# Patient Record
Sex: FEMALE | Race: BLACK OR AFRICAN AMERICAN | Employment: FULL TIME | URBAN - METROPOLITAN AREA
[De-identification: names, ages, dates, MRNs, and addresses within clinical notes are randomized per-mention and may not be internally consistent; named-entity substitution may affect disease eponyms.]

---

## 2023-02-10 PROBLEM — R35.0 INCREASED FREQUENCY OF URINATION: Status: ACTIVE | Noted: 2023-02-10

## 2023-02-10 PROBLEM — N95.1 PERIMENOPAUSAL SYMPTOMS: Status: ACTIVE | Noted: 2023-02-10

## 2023-02-10 PROBLEM — R03.0 ELEVATED BP WITHOUT DIAGNOSIS OF HYPERTENSION: Status: ACTIVE | Noted: 2023-02-10

## 2023-02-10 PROBLEM — E78.2 COMBINED HYPERLIPIDEMIA: Status: ACTIVE | Noted: 2023-02-10

## 2023-02-10 PROBLEM — D64.9 ANEMIA: Status: ACTIVE | Noted: 2023-02-10

## 2023-02-10 PROBLEM — K92.1 BLOOD IN STOOL: Status: ACTIVE | Noted: 2023-02-10

## 2023-02-10 PROBLEM — F32.81 PREMENSTRUAL DYSPHORIA: Status: ACTIVE | Noted: 2023-02-10

## 2023-02-10 PROBLEM — H52.32 ANISOMETROPIA AND ANISEIKONIA: Status: ACTIVE | Noted: 2023-02-10

## 2023-02-10 PROBLEM — I10 BENIGN ESSENTIAL HYPERTENSION: Status: ACTIVE | Noted: 2023-02-10

## 2023-02-10 PROBLEM — H52.4 BILATERAL PRESBYOPIA: Status: ACTIVE | Noted: 2023-02-10

## 2023-02-10 PROBLEM — B96.89 BACTERIAL VAGINOSIS: Status: ACTIVE | Noted: 2023-02-10

## 2023-02-10 PROBLEM — R12 HEARTBURN: Status: ACTIVE | Noted: 2023-02-10

## 2023-02-10 PROBLEM — H52.03 HYPERMETROPIA OF BOTH EYES: Status: ACTIVE | Noted: 2023-02-10

## 2023-02-10 PROBLEM — N76.0 BACTERIAL VAGINOSIS: Status: ACTIVE | Noted: 2023-02-10

## 2023-02-10 PROBLEM — H52.31 ANISOMETROPIA AND ANISEIKONIA: Status: ACTIVE | Noted: 2023-02-10

## 2023-02-10 PROBLEM — E66.9 OBESITY (BMI 35.0-39.9 WITHOUT COMORBIDITY): Status: ACTIVE | Noted: 2023-02-10

## 2023-04-03 ENCOUNTER — OFFICE VISIT (OUTPATIENT)
Dept: PRIMARY CARE | Facility: CLINIC | Age: 48
End: 2023-04-03
Payer: COMMERCIAL

## 2023-04-03 ENCOUNTER — LAB (OUTPATIENT)
Dept: LAB | Facility: LAB | Age: 48
End: 2023-04-03
Payer: COMMERCIAL

## 2023-04-03 ENCOUNTER — APPOINTMENT (OUTPATIENT)
Dept: LAB | Facility: LAB | Age: 48
End: 2023-04-03
Payer: COMMERCIAL

## 2023-04-03 VITALS
HEART RATE: 84 BPM | BODY MASS INDEX: 32.2 KG/M2 | OXYGEN SATURATION: 97 % | DIASTOLIC BLOOD PRESSURE: 85 MMHG | WEIGHT: 175 LBS | SYSTOLIC BLOOD PRESSURE: 131 MMHG | TEMPERATURE: 96.4 F | HEIGHT: 62 IN

## 2023-04-03 DIAGNOSIS — Z00.00 HEALTH MAINTENANCE EXAMINATION: ICD-10-CM

## 2023-04-03 DIAGNOSIS — I10 BENIGN ESSENTIAL HYPERTENSION: ICD-10-CM

## 2023-04-03 DIAGNOSIS — R23.2 HOT FLASHES: ICD-10-CM

## 2023-04-03 DIAGNOSIS — N92.6 IRREGULAR MENSES: ICD-10-CM

## 2023-04-03 DIAGNOSIS — Z00.00 HEALTH MAINTENANCE EXAMINATION: Primary | ICD-10-CM

## 2023-04-03 LAB
ANION GAP IN SER/PLAS: 12 MMOL/L (ref 10–20)
CALCIUM (MG/DL) IN SER/PLAS: 9.4 MG/DL (ref 8.6–10.6)
CARBON DIOXIDE, TOTAL (MMOL/L) IN SER/PLAS: 27 MMOL/L (ref 21–32)
CHLORIDE (MMOL/L) IN SER/PLAS: 103 MMOL/L (ref 98–107)
CHOLESTEROL (MG/DL) IN SER/PLAS: 202 MG/DL (ref 0–199)
CHOLESTEROL IN HDL (MG/DL) IN SER/PLAS: 44.1 MG/DL
CHOLESTEROL/HDL RATIO: 4.6
CREATININE (MG/DL) IN SER/PLAS: 0.76 MG/DL (ref 0.5–1.05)
GFR FEMALE: >90 ML/MIN/1.73M2
GLUCOSE (MG/DL) IN SER/PLAS: 93 MG/DL (ref 74–99)
HEPATITIS C VIRUS AB PRESENCE IN SERUM: NONREACTIVE
HIV 1/ 2 AG/AB SCREEN: NONREACTIVE
LDL: 129 MG/DL (ref 0–99)
POTASSIUM (MMOL/L) IN SER/PLAS: 4.3 MMOL/L (ref 3.5–5.3)
PREGNANCY TEST URINE, POC: NEGATIVE
SODIUM (MMOL/L) IN SER/PLAS: 138 MMOL/L (ref 136–145)
TRIGLYCERIDE (MG/DL) IN SER/PLAS: 144 MG/DL (ref 0–149)
UREA NITROGEN (MG/DL) IN SER/PLAS: 13 MG/DL (ref 6–23)
VLDL: 29 MG/DL (ref 0–40)

## 2023-04-03 PROCEDURE — 81025 URINE PREGNANCY TEST: CPT | Performed by: FAMILY MEDICINE

## 2023-04-03 PROCEDURE — 1036F TOBACCO NON-USER: CPT | Performed by: FAMILY MEDICINE

## 2023-04-03 PROCEDURE — 3079F DIAST BP 80-89 MM HG: CPT | Performed by: FAMILY MEDICINE

## 2023-04-03 PROCEDURE — 36415 COLL VENOUS BLD VENIPUNCTURE: CPT

## 2023-04-03 PROCEDURE — 87389 HIV-1 AG W/HIV-1&-2 AB AG IA: CPT

## 2023-04-03 PROCEDURE — 80061 LIPID PANEL: CPT

## 2023-04-03 PROCEDURE — 3075F SYST BP GE 130 - 139MM HG: CPT | Performed by: FAMILY MEDICINE

## 2023-04-03 PROCEDURE — 99396 PREV VISIT EST AGE 40-64: CPT | Performed by: FAMILY MEDICINE

## 2023-04-03 PROCEDURE — 80048 BASIC METABOLIC PNL TOTAL CA: CPT

## 2023-04-03 PROCEDURE — 86803 HEPATITIS C AB TEST: CPT

## 2023-04-03 RX ORDER — AMLODIPINE BESYLATE 2.5 MG/1
2.5 TABLET ORAL DAILY
Qty: 90 TABLET | Refills: 3 | Status: SHIPPED | OUTPATIENT
Start: 2023-04-03

## 2023-04-03 ASSESSMENT — PAIN SCALES - GENERAL: PAINLEVEL: 0-NO PAIN

## 2023-04-03 NOTE — PROGRESS NOTES
"Subjective   Patient ID: Ashley Gu is a 47 y.o. who presents for Follow-up (Pt is here for a BP check.).  HPI    Ashley Gu is a 47 y.o. year old here for an annual physical.    Concerns:     Hypertension  - Taking amlodipine 2.5 mg daily  - No headaches, chest pain, shortness of breath, vision changes, dizziness    Irregular menses  - Having hot flashes  - LMP February  - Using condoms  - Partner had vasectomy     Diet: Healthy diet  Exercise: Regular exercise   Tobacco: None  Alcohol: Occasional  Drugs: None  Sex: Monogamous 1 male partner  LMP: February  BIrth Control: Condoms, vasectomy  Depression: PHQ2=0  IPV: Negative    Immunizations due: None    Breast Cancer  - last mammogram: Dec 2022    Cervical Cancer  - last pap: April 2022    Colon Cancer  - last colonoscopy: 2020, due 2030    Lung Cancer: Not indicated    Lipid: LDL elevated last year, will repeat  A1c: Fasting blood sugar wnl  HIV (15-65): Never done, will order  HCV (18-79): Never done, will order       Review of Systems  10 point review of systems negative except as noted in HPI.    Objective     /85 (BP Location: Left arm, Patient Position: Sitting, BP Cuff Size: Large adult)   Pulse 84   Temp 35.8 °C (96.4 °F) (Temporal)   Ht 1.575 m (5' 2\")   Wt 79.4 kg (175 lb)   SpO2 97%   BMI 32.01 kg/m²   General: well appearing, no distress  Neck: No lymphadenopathy, no thyromegaly  CV: Regular rate and rhythm, no murmur  Lungs: Clear to auscultation bilaterally  Abdomen: Soft, nontender, nondistended  Extremities: No edema noted  Psych: Appropriate mood and affect     Assessment/Plan   46 yo F here for health maintenance exam     HM  - Up to date on routine cancer screening  - Labs as ordered    HTN  - Continue amlodipine    Hot flashes, irregular periods  - Suspect perimenopause  - Discussed options. She would like to see gynecology to discuss hot flashes  - Discussed birth control, she will consider  - Pregnancy test " negative today    RTC 6 months or sooner as needed

## 2023-04-12 DIAGNOSIS — Z30.09 BIRTH CONTROL COUNSELING: Primary | ICD-10-CM

## 2023-04-12 RX ORDER — NORETHINDRONE ACETATE AND ETHINYL ESTRADIOL 1MG-20(21)
1 KIT ORAL DAILY
Qty: 28 TABLET | Refills: 5 | Status: SHIPPED | OUTPATIENT
Start: 2023-04-12 | End: 2024-04-01 | Stop reason: WASHOUT

## 2023-06-09 ENCOUNTER — LAB (OUTPATIENT)
Dept: LAB | Facility: LAB | Age: 48
End: 2023-06-09
Payer: COMMERCIAL

## 2023-06-09 LAB — TOBACCO SCREEN, URINE: NEGATIVE

## 2023-08-02 LAB
ANION GAP IN SER/PLAS: 13 MMOL/L (ref 10–20)
CALCIUM (MG/DL) IN SER/PLAS: 9.5 MG/DL (ref 8.6–10.6)
CARBON DIOXIDE, TOTAL (MMOL/L) IN SER/PLAS: 27 MMOL/L (ref 21–32)
CHLORIDE (MMOL/L) IN SER/PLAS: 104 MMOL/L (ref 98–107)
CREATININE (MG/DL) IN SER/PLAS: 0.91 MG/DL (ref 0.5–1.05)
ERYTHROCYTE DISTRIBUTION WIDTH (RATIO) BY AUTOMATED COUNT: 14.1 % (ref 11.5–14.5)
ERYTHROCYTE MEAN CORPUSCULAR HEMOGLOBIN CONCENTRATION (G/DL) BY AUTOMATED: 32.7 G/DL (ref 32–36)
ERYTHROCYTE MEAN CORPUSCULAR VOLUME (FL) BY AUTOMATED COUNT: 93 FL (ref 80–100)
ERYTHROCYTES (10*6/UL) IN BLOOD BY AUTOMATED COUNT: 4.06 X10E12/L (ref 4–5.2)
GFR FEMALE: 77 ML/MIN/1.73M2
GLUCOSE (MG/DL) IN SER/PLAS: 115 MG/DL (ref 74–99)
HEMATOCRIT (%) IN BLOOD BY AUTOMATED COUNT: 37.6 % (ref 36–46)
HEMOGLOBIN (G/DL) IN BLOOD: 12.3 G/DL (ref 12–16)
LEUKOCYTES (10*3/UL) IN BLOOD BY AUTOMATED COUNT: 6.4 X10E9/L (ref 4.4–11.3)
NRBC (PER 100 WBCS) BY AUTOMATED COUNT: 0 /100 WBC (ref 0–0)
PLATELETS (10*3/UL) IN BLOOD AUTOMATED COUNT: 273 X10E9/L (ref 150–450)
POTASSIUM (MMOL/L) IN SER/PLAS: 4.2 MMOL/L (ref 3.5–5.3)
SODIUM (MMOL/L) IN SER/PLAS: 140 MMOL/L (ref 136–145)
UREA NITROGEN (MG/DL) IN SER/PLAS: 17 MG/DL (ref 6–23)

## 2023-08-22 RX ORDER — ACETAMINOPHEN 500 MG
TABLET ORAL
COMMUNITY
End: 2024-04-01 | Stop reason: WASHOUT

## 2023-08-22 RX ORDER — NORETHINDRONE 5 MG/1
1 TABLET ORAL 3 TIMES DAILY
COMMUNITY
Start: 2023-04-09 | End: 2024-04-01 | Stop reason: WASHOUT

## 2023-10-02 ENCOUNTER — OFFICE VISIT (OUTPATIENT)
Dept: OPHTHALMOLOGY | Facility: CLINIC | Age: 48
End: 2023-10-02
Payer: COMMERCIAL

## 2023-10-02 DIAGNOSIS — H52.31 ANISOMETROPIA AND ANISEIKONIA: Primary | ICD-10-CM

## 2023-10-02 DIAGNOSIS — H52.32 ANISOMETROPIA AND ANISEIKONIA: Primary | ICD-10-CM

## 2023-10-02 DIAGNOSIS — H52.4 BILATERAL PRESBYOPIA: ICD-10-CM

## 2023-10-02 PROCEDURE — 92015 DETERMINE REFRACTIVE STATE: CPT | Performed by: OPTOMETRIST

## 2023-10-02 PROCEDURE — 92014 COMPRE OPH EXAM EST PT 1/>: CPT | Performed by: OPTOMETRIST

## 2023-10-02 ASSESSMENT — TONOMETRY
OS_IOP_MMHG: 13
OD_IOP_MMHG: 13
IOP_METHOD: GOLDMANN APPLANATION

## 2023-10-02 ASSESSMENT — VISUAL ACUITY
OS_CC: 20/20-3
OD_CC: 20/20-2
METHOD: SNELLEN - LINEAR
CORRECTION_TYPE: GLASSES

## 2023-10-02 ASSESSMENT — REFRACTION_MANIFEST
OS_SPHERE: +0.75
OD_SPHERE: +1.75
OS_ADD: +2.25
OD_ADD: +2.25
OS_AXIS: 120
OS_CYLINDER: -0.25
OD_CYLINDER: SPHERE

## 2023-10-02 ASSESSMENT — ENCOUNTER SYMPTOMS
CARDIOVASCULAR NEGATIVE: 0
ENDOCRINE NEGATIVE: 0
MUSCULOSKELETAL NEGATIVE: 0
HEMATOLOGIC/LYMPHATIC NEGATIVE: 0
ALLERGIC/IMMUNOLOGIC NEGATIVE: 0
GASTROINTESTINAL NEGATIVE: 0
PSYCHIATRIC NEGATIVE: 0
CONSTITUTIONAL NEGATIVE: 0
RESPIRATORY NEGATIVE: 0
NEUROLOGICAL NEGATIVE: 0
EYES NEGATIVE: 0

## 2023-10-02 ASSESSMENT — REFRACTION_WEARINGRX
OS_ADD: +2.00
OD_CYLINDER: SPHERE
OS_CYLINDER: -0.25
OD_SPHERE: +1.75
OS_SPHERE: +0.50
OS_AXIS: 120
OD_ADD: +2.00

## 2023-10-02 ASSESSMENT — CUP TO DISC RATIO
OS_RATIO: 0.35
OD_RATIO: 0.3

## 2023-10-02 ASSESSMENT — SLIT LAMP EXAM - LIDS
COMMENTS: NORMAL
COMMENTS: NORMAL

## 2024-03-30 ASSESSMENT — PROMIS GLOBAL HEALTH SCALE
EMOTIONAL_PROBLEMS: RARELY
CARRYOUT_PHYSICAL_ACTIVITIES: COMPLETELY
RATE_GENERAL_HEALTH: VERY GOOD
RATE_SOCIAL_SATISFACTION: GOOD
RATE_AVERAGE_PAIN: 0
RATE_AVERAGE_FATIGUE: MILD
CARRYOUT_SOCIAL_ACTIVITIES: VERY GOOD
RATE_PHYSICAL_HEALTH: VERY GOOD
RATE_QUALITY_OF_LIFE: VERY GOOD
RATE_MENTAL_HEALTH: VERY GOOD

## 2024-04-01 ENCOUNTER — OFFICE VISIT (OUTPATIENT)
Dept: PRIMARY CARE | Facility: CLINIC | Age: 49
End: 2024-04-01
Payer: COMMERCIAL

## 2024-04-01 VITALS
BODY MASS INDEX: 31.28 KG/M2 | WEIGHT: 170 LBS | RESPIRATION RATE: 16 BRPM | TEMPERATURE: 98 F | OXYGEN SATURATION: 99 % | HEART RATE: 80 BPM | HEIGHT: 62 IN

## 2024-04-01 DIAGNOSIS — Z12.31 ENCOUNTER FOR SCREENING MAMMOGRAM FOR MALIGNANT NEOPLASM OF BREAST: ICD-10-CM

## 2024-04-01 DIAGNOSIS — I10 BENIGN ESSENTIAL HYPERTENSION: Primary | ICD-10-CM

## 2024-04-01 PROCEDURE — 99213 OFFICE O/P EST LOW 20 MIN: CPT | Performed by: FAMILY MEDICINE

## 2024-04-01 PROCEDURE — 1036F TOBACCO NON-USER: CPT | Performed by: FAMILY MEDICINE

## 2024-04-01 ASSESSMENT — ENCOUNTER SYMPTOMS
LOSS OF SENSATION IN FEET: 0
OCCASIONAL FEELINGS OF UNSTEADINESS: 0
DEPRESSION: 0

## 2024-04-01 ASSESSMENT — PAIN SCALES - GENERAL: PAINLEVEL: 0-NO PAIN

## 2024-04-01 ASSESSMENT — PATIENT HEALTH QUESTIONNAIRE - PHQ9
SUM OF ALL RESPONSES TO PHQ9 QUESTIONS 1 AND 2: 0
2. FEELING DOWN, DEPRESSED OR HOPELESS: NOT AT ALL
1. LITTLE INTEREST OR PLEASURE IN DOING THINGS: NOT AT ALL

## 2024-04-01 NOTE — PROGRESS NOTES
"Subjective   Patient ID: Ashley Gu is a 48 y.o. who presents for Hypertension and Med Refill.    Hypertension  - Has been out of medication since December  - No headaches, chest pain, shortness of breath, vision changes, dizziness  - Unsure of home BP  - BP today 135/84  - Remains physically active, healthy diet       Review of Systems  10 point review of systems negative except as noted in HPI.    Objective     Pulse 80   Temp 36.7 °C (98 °F)   Resp 16   Ht 1.575 m (5' 2\")   Wt 77.1 kg (170 lb)   SpO2 99%   BMI 31.09 kg/m²   General: well appearing, no distress  Neck: No lymphadenopathy, no thyromegaly  CV: Regular rate and rhythm, no murmur  Lungs: Clear to auscultation bilaterally  Abdomen: Soft, nontender, nondistended  Extremities: No edema noted  Psych: Appropriate mood and affect     Assessment/Plan   49 yo F here for follow-up    HTN  - BP borderline while off medication  - Will check home BP and report back. Will resume amlodipine if elevated    Follow-up for health maintenance visit. Will order labs and mammogram in the meantime.  "

## 2024-05-06 ENCOUNTER — APPOINTMENT (OUTPATIENT)
Dept: PRIMARY CARE | Facility: CLINIC | Age: 49
End: 2024-05-06
Payer: COMMERCIAL

## 2024-09-06 NOTE — PROGRESS NOTES
Anisometropia stable and pt advised to RTC with diplopia/nausea/dizziness.  A spectacle prescription was given at the patient`s request. The patient was asked to return to our clinic in one year or sooner if ocular or vision changes occur.    normal... Well appearing, awake, alert, oriented to person, place, time/situation anxious appearing

## 2025-05-14 ENCOUNTER — APPOINTMENT (OUTPATIENT)
Dept: OPHTHALMOLOGY | Facility: CLINIC | Age: 50
End: 2025-05-14
Payer: COMMERCIAL

## 2025-05-14 DIAGNOSIS — H52.4 HYPEROPIA WITH PRESBYOPIA OF BOTH EYES: Primary | ICD-10-CM

## 2025-05-14 DIAGNOSIS — H52.03 HYPEROPIA WITH PRESBYOPIA OF BOTH EYES: Primary | ICD-10-CM

## 2025-05-14 PROCEDURE — 92014 COMPRE OPH EXAM EST PT 1/>: CPT | Performed by: OPTOMETRIST

## 2025-05-14 PROCEDURE — 92015 DETERMINE REFRACTIVE STATE: CPT | Performed by: OPTOMETRIST

## 2025-05-14 ASSESSMENT — VISUAL ACUITY
METHOD: SNELLEN - LINEAR
OS_SC: 20/25
OD_SC+: -2
OD_SC: 20/30
OS_SC+: -1

## 2025-05-14 ASSESSMENT — ENCOUNTER SYMPTOMS
NEUROLOGICAL NEGATIVE: 0
CONSTITUTIONAL NEGATIVE: 0
RESPIRATORY NEGATIVE: 0
HEMATOLOGIC/LYMPHATIC NEGATIVE: 0
CARDIOVASCULAR NEGATIVE: 0
ALLERGIC/IMMUNOLOGIC NEGATIVE: 0
MUSCULOSKELETAL NEGATIVE: 0
ENDOCRINE NEGATIVE: 0
GASTROINTESTINAL NEGATIVE: 0
PSYCHIATRIC NEGATIVE: 0
EYES NEGATIVE: 1

## 2025-05-14 ASSESSMENT — REFRACTION_WEARINGRX
OS_SPHERE: DIDN'T BRING
OD_CYLINDER: SPHERE
OS_SPHERE: +0.75
OS_ADD: +2.25
OD_SPHERE: DIDN'T BRING
OS_AXIS: 120
OS_CYLINDER: -0.25
OD_ADD: +2.25
OD_SPHERE: +1.75

## 2025-05-14 ASSESSMENT — SLIT LAMP EXAM - LIDS
COMMENTS: NORMAL
COMMENTS: NORMAL

## 2025-05-14 ASSESSMENT — CONF VISUAL FIELD
OD_INFERIOR_TEMPORAL_RESTRICTION: 0
OD_NORMAL: 1
OS_INFERIOR_NASAL_RESTRICTION: 0
OS_SUPERIOR_TEMPORAL_RESTRICTION: 0
OS_SUPERIOR_NASAL_RESTRICTION: 0
OD_SUPERIOR_NASAL_RESTRICTION: 0
OS_NORMAL: 1
OD_INFERIOR_NASAL_RESTRICTION: 0
OS_INFERIOR_TEMPORAL_RESTRICTION: 0
OD_SUPERIOR_TEMPORAL_RESTRICTION: 0

## 2025-05-14 ASSESSMENT — REFRACTION_MANIFEST
OS_SPHERE: +1.00
OD_SPHERE: +1.75
OD_ADD: +2.25
OD_CYLINDER: SPHERE
OS_ADD: +2.25
OS_CYLINDER: SPHERE

## 2025-05-14 ASSESSMENT — CUP TO DISC RATIO
OS_RATIO: 0.35
OD_RATIO: 0.3

## 2025-05-14 ASSESSMENT — TONOMETRY
IOP_METHOD: GOLDMANN APPLANATION
OD_IOP_MMHG: 14
OS_IOP_MMHG: 15

## 2025-05-14 NOTE — PROGRESS NOTES
Less anisometropia and pt advised to RTC with diplopia/nausea/dizziness.      A spectacle prescription was given at the patient`s request. Minor change.     The patient was asked to return to our clinic in one year or sooner if ocular or vision changes occur.